# Patient Record
Sex: FEMALE | Race: OTHER | Employment: UNEMPLOYED | URBAN - METROPOLITAN AREA
[De-identification: names, ages, dates, MRNs, and addresses within clinical notes are randomized per-mention and may not be internally consistent; named-entity substitution may affect disease eponyms.]

---

## 2024-02-25 PROCEDURE — 0241U SARS-COV-2/FLU A AND B/RSV BY PCR (GENEXPERT): CPT | Performed by: EMERGENCY MEDICINE

## 2024-02-25 PROCEDURE — 99284 EMERGENCY DEPT VISIT MOD MDM: CPT

## 2024-02-25 PROCEDURE — 0241U SARS-COV-2/FLU A AND B/RSV BY PCR (GENEXPERT): CPT

## 2024-02-26 ENCOUNTER — APPOINTMENT (OUTPATIENT)
Dept: GENERAL RADIOLOGY | Facility: HOSPITAL | Age: 24
End: 2024-02-26
Attending: EMERGENCY MEDICINE

## 2024-02-26 ENCOUNTER — HOSPITAL ENCOUNTER (EMERGENCY)
Facility: HOSPITAL | Age: 24
Discharge: HOME | End: 2024-02-26
Attending: EMERGENCY MEDICINE

## 2024-02-26 VITALS
WEIGHT: 136.69 LBS | HEART RATE: 87 BPM | HEIGHT: 62.99 IN | RESPIRATION RATE: 17 BRPM | DIASTOLIC BLOOD PRESSURE: 68 MMHG | OXYGEN SATURATION: 100 % | SYSTOLIC BLOOD PRESSURE: 102 MMHG | TEMPERATURE: 98 F | BODY MASS INDEX: 24.22 KG/M2

## 2024-02-26 DIAGNOSIS — J18.9 PNEUMONIA OF RIGHT LOWER LOBE DUE TO INFECTIOUS ORGANISM: Primary | ICD-10-CM

## 2024-02-26 DIAGNOSIS — Z34.90 PREGNANCY, UNSPECIFIED GESTATIONAL AGE (HCC): ICD-10-CM

## 2024-02-26 LAB
FLUAV + FLUBV RNA SPEC NAA+PROBE: NEGATIVE
FLUAV + FLUBV RNA SPEC NAA+PROBE: NEGATIVE
RSV RNA SPEC NAA+PROBE: NEGATIVE
SARS-COV-2 RNA RESP QL NAA+PROBE: NOT DETECTED

## 2024-02-26 PROCEDURE — 71045 X-RAY EXAM CHEST 1 VIEW: CPT | Performed by: EMERGENCY MEDICINE

## 2024-02-26 PROCEDURE — 87430 STREP A AG IA: CPT | Performed by: EMERGENCY MEDICINE

## 2024-02-26 RX ORDER — AMOXICILLIN AND CLAVULANATE POTASSIUM 875; 125 MG/1; MG/1
1 TABLET, FILM COATED ORAL 2 TIMES DAILY
Qty: 20 TABLET | Refills: 0 | Status: SHIPPED | OUTPATIENT
Start: 2024-02-26 | End: 2024-03-07

## 2024-02-26 RX ORDER — AZITHROMYCIN 250 MG/1
TABLET, FILM COATED ORAL
Qty: 6 TABLET | Refills: 0 | Status: SHIPPED | OUTPATIENT
Start: 2024-02-26 | End: 2024-03-02

## 2024-02-27 NOTE — ED PROVIDER NOTES
Patient Seen in: [facility] Emergency Department      History     Chief Complaint   Patient presents with    Cough/URI     Stated Complaint:     Subjective:   HPI    Patient is a 23-year-old female presenting to the ED with URI symptoms.  Patient is a family member who is ill with similar symptoms, she became ill yesterday as well as today with symptoms consisting of a sore throat, hoarseness to voice, dry, nonproductive cough and subjective fever.  Patient also notes that she had a positive pregnancy test at home last night.  The patient states that she plans to terminate the pregnancy.  She has no complaints of abdominal pain.  No vaginal bleeding or discharge.  She has no shortness of breath or chest pain.  No complaints of congestion.  No difficulty swallowing.  G4, P3.  History obtained with use of .    Objective:   History reviewed. No pertinent past medical history.           History reviewed. No pertinent surgical history.             Social History     Socioeconomic History    Marital status: Single   Tobacco Use    Smoking status: Never    Smokeless tobacco: Never   Vaping Use    Vaping Use: Never used   Substance and Sexual Activity    Alcohol use: Never    Drug use: Never              Review of Systems    Positive for stated complaint:   Other systems are as noted in HPI.  Constitutional and vital signs reviewed.      All other systems reviewed and negative except as noted above.    Physical Exam     ED Triage Vitals [02/25/24 2330]   /68   Pulse 86   Resp 18   Temp 97.7 °F (36.5 °C)   Temp src    SpO2 96 %   O2 Device None (Room air)       Current:/68   Pulse 87   Temp 97.7 °F (36.5 °C)   Resp 17   Ht 160 cm (5' 2.99\")   Wt 62 kg   LMP 01/29/2024 (Exact Date)   SpO2 100%   BMI 24.22 kg/m²         Physical Exam  Vitals and nursing note reviewed.   Constitutional:       General: She is not in acute distress.     Appearance: Normal appearance. She is  well-developed. She is not ill-appearing or toxic-appearing.      Comments: Hoarseness of voice noted.  No stridor, swallowing secretions.   HENT:      Head: Normocephalic and atraumatic.      Right Ear: External ear normal.      Left Ear: External ear normal.      Mouth/Throat:      Mouth: Mucous membranes are moist.      Pharynx: Oropharynx is clear.   Eyes:      Conjunctiva/sclera: Conjunctivae normal.   Cardiovascular:      Rate and Rhythm: Normal rate and regular rhythm.      Heart sounds: Normal heart sounds.   Pulmonary:      Effort: Pulmonary effort is normal.      Breath sounds: Rales present.      Comments: Rales right lung base.  Abdominal:      General: Abdomen is flat. Bowel sounds are normal. There is no distension.      Tenderness: There is no abdominal tenderness.   Musculoskeletal:      Right lower leg: No edema.      Left lower leg: No edema.   Skin:     General: Skin is warm.      Capillary Refill: Capillary refill takes less than 2 seconds.      Findings: No rash.   Neurological:      General: No focal deficit present.      Mental Status: She is alert and oriented to person, place, and time.   Psychiatric:         Mood and Affect: Mood normal.         Behavior: Behavior normal.               ED Course     Labs Reviewed   SARS-COV-2/FLU A AND B/RSV BY PCR (GENEXPERT) - Normal    Narrative:     This test is intended for the qualitative detection and differentiation of SARS-CoV-2, influenza A, influenza B, and respiratory syncytial virus (RSV) viral RNA in nasopharyngeal or nares swabs from individuals suspected of respiratory viral infection consistent with COVID-19 by their healthcare provider. Signs and symptoms of respiratory viral infection due to SARS-CoV-2, influenza, and RSV can be similar.    Test performed using the Xpert Xpress SARS-CoV-2/FLU/RSV (real time RT-PCR)  assay on the GeneXpert instrument, [address] [place], [place] [address].   This test is being used under the Food and Drug  Administration's Emergency Use Authorization.    The authorized Fact Sheet for Healthcare Providers for this assay is available upon request from the laboratory.   RAPID STREP A SCREEN (LC) - Normal    Narrative:     A confirmatory culture is recommended if clinically indicated.                      MDM      History obtained from patient.     Differential diagnosis includes viral URI, pneumonia, strep throat, laryngitis.    Previous records reviewed.  No available records for review at this time.    Testing considered and ordered includes viral testing, chest x-ray.  Patient does report a confirmed positive home pregnancy test last night.  Labs were not obtained as the patient's O2 saturation is 100% on room air, she is afebrile in ED without use of antipyretics, not tachycardic, no tachypnea or respiratory distress.    I reviewed all results.  Viral testing negative, strep testing negative.    I personally reviewed the radiographs and my individual interpretation shows no large infiltrate or consolidation.  No effusion.  I also reviewed the official report which shows   XR CHEST AP PORTABLE  (CPT=71045)    Result Date: 2/26/2024  PROCEDURE:  XR CHEST AP PORTABLE  (CPT=71045)  TECHNIQUE:  AP chest radiograph was obtained.  COMPARISON:  None.  INDICATIONS:  rales RLL, pregnant  PATIENT STATED HISTORY: (As transcribed by Technologist)  Cough.    FINDINGS:  Mild right lower lobe interstitial and ground-glass opacities suspected.  The remainder of the lungs are clear.  Cardiac silhouette and pulmonary vasculature are within normal limits.  No evidence of pleural disease.            CONCLUSION:  1. Subtle right lower lobe interstitial and ground-glass opacity suspected, per 10 [address] representing atelectasis versus early pneumonia.  Short-term follow-up recommended. 2. The preliminary report was reviewed.   LOCATION:  [facility]      Dictated by (CST): Rolando Dorman DO on 2/26/2024 at 8:13 AM     Finalized by (CST): Dandy  DO Rolando on 2/26/2024 at 8:13 AM            Interventions in care included none required in ED.  Patient does have presence of rales on examination in the right lower lobe with a nonproductive cough, subjective fever at home but is afebrile at this time.  Well-appearing and nontoxic.  Will cover with antibiotics, Augmentin and azithromycin, given positive pregnancy test at home last night although she plans on terminating pregnancy.  Discussed discharge plan and close outpatient follow-up for reevaluation as well as returning to the ED if symptoms worsen, persist, or new symptoms develop.  Patient understands that she will also require outpatient follow-up for her pregnancy.  [facility].  Will also provide on-call gynecology if needed.                                         Medical Decision Making      Disposition and Plan     Clinical Impression:  1. Pneumonia of right lower lobe due to infectious organism    2. Pregnancy, unspecified gestational age (HCC)         Disposition:  Discharge  2/26/2024  2:54 am    Follow-up:  Sanjiv Armijo MD  [address] [address]  [phone]    Schedule an appointment as soon as possible for a visit in 2 day(s)      [facility] Emergency Department  [address] [address]  [phone]  Follow up  IF SYMPTOMS WORSEN, PERSIST, OR NEW SYMPTOMS DEVEL          Medications Prescribed:  Discharge Medication List as of 2/26/2024  2:56 AM        START taking these medications    Details   amoxicillin clavulanate 875-125 MG Oral Tab Take 1 tablet by mouth 2 (two) times daily for 10 days., Normal, Disp-20 tablet, R-0      azithromycin (ZITHROMAX Z-RAMYA) 250 MG Oral Tab 500 mg once followed by 250 mg daily x 4 days, Normal, Disp-6 tablet, R-0